# Patient Record
Sex: FEMALE | Race: BLACK OR AFRICAN AMERICAN | Employment: FULL TIME | URBAN - METROPOLITAN AREA
[De-identification: names, ages, dates, MRNs, and addresses within clinical notes are randomized per-mention and may not be internally consistent; named-entity substitution may affect disease eponyms.]

---

## 2017-02-24 RX ORDER — DULOXETIN HYDROCHLORIDE 60 MG/1
CAPSULE, DELAYED RELEASE ORAL
Qty: 30 CAP | Refills: 0 | Status: SHIPPED | OUTPATIENT
Start: 2017-02-24 | End: 2017-06-12 | Stop reason: SDUPTHER

## 2017-02-27 RX ORDER — GABAPENTIN 600 MG/1
TABLET ORAL
Qty: 90 TAB | Refills: 0 | Status: SHIPPED | OUTPATIENT
Start: 2017-02-27 | End: 2017-07-10 | Stop reason: ALTCHOICE

## 2017-03-13 ENCOUNTER — OFFICE VISIT (OUTPATIENT)
Dept: ORTHOPEDIC SURGERY | Age: 62
End: 2017-03-13

## 2017-03-13 VITALS
HEIGHT: 67 IN | RESPIRATION RATE: 18 BRPM | WEIGHT: 248 LBS | SYSTOLIC BLOOD PRESSURE: 166 MMHG | HEART RATE: 84 BPM | DIASTOLIC BLOOD PRESSURE: 79 MMHG | BODY MASS INDEX: 38.92 KG/M2

## 2017-03-13 DIAGNOSIS — M54.16 RADICULOPATHY, LUMBAR REGION: ICD-10-CM

## 2017-03-13 DIAGNOSIS — M48.061 LUMBAR SPINAL STENOSIS: ICD-10-CM

## 2017-03-13 DIAGNOSIS — M51.26 OTHER INTERVERTEBRAL DISC DISPLACEMENT, LUMBAR REGION: ICD-10-CM

## 2017-03-13 DIAGNOSIS — M51.36 OTHER INTERVERTEBRAL DISC DEGENERATION, LUMBAR REGION: Primary | ICD-10-CM

## 2017-03-13 RX ORDER — DULOXETIN HYDROCHLORIDE 60 MG/1
60 CAPSULE, DELAYED RELEASE ORAL DAILY
Qty: 30 CAP | Refills: 2 | Status: SHIPPED | OUTPATIENT
Start: 2017-03-13 | End: 2017-06-12 | Stop reason: SDUPTHER

## 2017-03-13 NOTE — PROGRESS NOTES
[facility]  [address], [place]   Phone: [phone]  Fax: [phone]        PROGRESS NOTE      HISTORY OF PRESENT ILLNESS:  The patient is a 57 y.o. female and was seen today for follow up of low back pain into the left hip extending to the RLE laterally to the foot. She reports bilateral foot numbness in the morning which she speculates could be due to neuropathy. She states that sitting exacerbates her pain. Previously, patient had complaints of low back pain into BLE with pain extending to the knee in the right lower extremity and into the calf in the left lower extremity, with extremity pain greater then back pain. She actually previously reported widespread pain complaints. More recently, she had an injury and continued to be followed by Dr. Regulo Acuna for lower back complaints extending into the BLE. She states she has numbness in bilateral lower extremities laterally to the knees. She states right extremity pain is greater than left. She reported an injury at work while moving a heavy box on 8/5/15. She has been treated with Percocet and Flexeril for pain relief, as well as Naproxen. Patient reports she is intolerant to TOPAMAX, due to memory loss. She reports NEURONTIN causes drowsiness. She tolerates increased Cymbalta 60 mg with slight additional benefit. She reports that she has still not returned to work following the [facility] and is filling for disability. Patient stated she started taking Insulin but blood sugars continue to be poorly controlled. She is followed by Dr. Nidia Kahn for DM. Lower extremity EMG per report revealed evidence of diabetic sensory neuropathy, which appeared to be mild. There was chronic L4-L5 radiculopathy bilaterally, worse on the left. Lumbar spine MRI from 12/9/15 was reviewed. Per report, there was congenitally narrowed spinal canal accentuates the severity of the patient's multilevel degenerative disc disease.  At L5-S1, there is a broad-based central disc protrusion resulting in mild central canal stenosis and narrowing of the lateral recesses. Additional moderate central canal stenosis is seen at L2-L3 and L4-L5. At her last clinical appointment, she was not particularly interested in surgical intervention or blocks at that time. Patient wished to continue her current treatment. She was provided with refills of Cymbalta 60 mg. I encouraged patient to perform her HEP daily. The patient returns today with pain location and distribution remain unchanged. She rates pain 2/10, a slight decrease since her last visit (3/10). Her symptoms wax and wane in nature. Pt is compliant with Cymbalta 60 mg per day. She last attended physical therapy in 2012 and continues with her HEP daily. Her blood sugars continue to be poorly controlled.  reviewed. Past Medical History:   Diagnosis Date    Arthritis     Essential hypertension, benign     High cholesterol     Low back pain     Precordial pain     Type II or unspecified type diabetes mellitus without mention of complication, not stated as uncontrolled     poorly controlled        Social History     Social History    Marital status: LEGALLY      Spouse name: N/A    Number of children: N/A    Years of education: N/A     Occupational History    Not on file. Social History Main Topics    Smoking status: Never Smoker    Smokeless tobacco: Never Used    Alcohol use No    Drug use: No    Sexual activity: Not on file     Other Topics Concern    Not on file     Social History Narrative       Current Outpatient Prescriptions   Medication Sig Dispense Refill    peg 400-propylene glycol (SYSTANE, PROPYLENE GLYCOL,) 0.4-0.3 % drop Administer  to left eye as needed.  DULoxetine (CYMBALTA) 60 mg capsule Take 1 Cap by mouth daily.  30 Cap 2    DULoxetine (CYMBALTA) 60 mg capsule TAKE 1 CAPSULE BY MOUTH DAILY 30 Cap 0    LANTUS 100 unit/mL injection       metFORMIN (GLUCOPHAGE) 500 mg tablet       multivitamin (ONE A DAY) tablet Take 1 Tab by mouth daily.  atorvastatin (LIPITOR) 20 mg tablet       ULTRA THIN LANCETS 31 gauge misc       gabapentin (NEURONTIN) 300 mg capsule Take 1 Cap by mouth three (3) times daily. 90 Cap 1    losartan-hydrochlorothiazide (HYZAAR) 100-25 mg per tablet Take 1 Tab by mouth daily.  naproxen sodium (ALEVE) 220 mg tablet Take 220 mg by mouth. Take 1 every day as needed.  gabapentin (NEURONTIN) 600 mg tablet TAKE 1 TABLET BY MOUTH THREE TIMES DAILY 90 Tab 0    nitroglycerin (NITROSTAT) 0.4 mg SL tablet       gabapentin (NEURONTIN) 100 mg capsule       DULoxetine (CYMBALTA) 20 mg capsule Take 1 Cap by mouth daily. 30 Cap 1    DULoxetine (CYMBALTA) 30 mg capsule Take 1 Cap by mouth daily. 30 Cap 1    LUTEIN PO Take  by mouth.  cyanocobalamin 1,000 mcg tablet Take 1,000 mcg by mouth daily.  TRAVATAN Z 0.004 % ophthalmic solution       omeprazole (PRILOSEC) 20 mg capsule       glipiZIDE SR (GLUCOTROL) 5 mg CR tablet       ADVOCATE REDI-CODE+ misc       ADVOCATE REDI-CODE+ strip       oxyCODONE-acetaminophen (PERCOCET) 5-325 mg per tablet   0    cyclobenzaprine (FLEXERIL) 10 mg tablet   0    saxagliptin (ONGLYZA) 5 mg tab tablet Take  by mouth daily.  FERROUS SULFATE by Does Not Apply route.  traMADol (ULTRAM) 50 mg tablet Take 50 mg by mouth every six (6) hours as needed for Pain.  topiramate (TOPAMAX) 25 mg tablet Day 1-3 take one tab at night,Day 4-6 take two tabs at night,Day 7- onward take 3 tabs at night 90 Tab 1    azelastine (OPTIVAR) 0.05 % ophthalmic solution 1 Every day. Use in affected eye(s)      simvastatin (ZOCOR) 20 mg tablet Take  by mouth nightly.  olmesartan-hydrochlorothiazide (BENICAR HCT) 40-25 mg per tablet Take 1 Tab by mouth daily.  pioglitazone (ACTOS) 30 mg tablet Take  by mouth daily.       glyBURIDE-metFORMIN (GLUCOVANCE) 5-500 mg per tablet Take 1 Tab by mouth two (2) times daily (with meals).  meclizine (ANTIVERT) 25 mg tablet Take  by mouth. Take 1 every six hours as needed. Allergies   Allergen Reactions    Codeine Nausea Only     The patient also can experience dizziness. PHYSICAL EXAMINATION    Visit Vitals    /79 (BP 1 Location: Right arm, BP Patient Position: Sitting)    Pulse 84    Resp 18    Ht 5' 7\" (1.702 m)    Wt 248 lb (112.5 kg)    BMI 38.84 kg/m2       CONSTITUTIONAL: NAD, A&O x 3  SENSATION: Intact to light touch throughout  RANGE OF MOTION: The patient has full passive range of motion in all four extremities. MOTOR:  Straight Leg Raise: Negative, bilateral               Hip Flex Knee Ext Knee Flex Ankle DF GTE Ankle PF Tone   Right +4/5 +4/5 +4/5 +4/5 +4/5 +4/5 +4/5   Left +4/5 +4/5 +4/5 +4/5 +4/5 +4/5 +4/5       ASSESSMENT   Dary Abdalla was seen today for back pain, leg pain and hip pain. Diagnoses and all orders for this visit:    Other intervertebral disc degeneration, lumbar region  -     REFERRAL TO PHYSICAL THERAPY    Radiculopathy, lumbar region  -     REFERRAL TO PHYSICAL THERAPY    Other intervertebral disc displacement, lumbar region  -     REFERRAL TO PHYSICAL THERAPY    Lumbar spinal stenosis  -     REFERRAL TO PHYSICAL THERAPY    Other orders  -     DULoxetine (CYMBALTA) 60 mg capsule; Take 1 Cap by mouth daily. IMPRESSION AND PLAN:  She is not a good candidate for surgical intervention or lumbar blocks at this time due to her poorly controlled blood sugars. I recommend she either f/u with Dr. Batsheva Bermeo or an endocrinologist to stabilize her blood sugars. I will refer her back to physical therapy for a refresher course with an emphasis on HEP. She wishes to continue on Cymbalta 60 mg per day and was provided with refills. I will see the patient back in 6 week's time or earlier if needed.       Written by Steffanie Méndez, as dictated by Tomasa Hicks MD  I examined the patient, reviewed and agree with the note.

## 2017-03-13 NOTE — MR AVS SNAPSHOT
Visit Information Date & Time Provider Department Dept. Phone Encounter #  
 3/13/2017  2:20 PM Geoff Mcknight, [address] [facility] - [place] [phone] Follow-up Instructions Return in about 6 weeks (around 4/24/2017). Upcoming Health Maintenance Date Due Hepatitis C Screening 1955 FOOT EXAM Q1 1/31/1963 EYE EXAM RETINAL OR DILATED Q1 1/31/1963 Pneumococcal 19-64 Medium Risk (1 of 1 - PPSV23) 1/31/1972 DTaP/Tdap/Td series (1 - Tdap) 1/31/1974 PAP AKA CERVICAL CYTOLOGY 1/31/1974 BREAST CANCER SCRN MAMMOGRAM 1/31/2003 FOBT Q 1 YEAR AGE 50-75 1/31/2003 MICROALBUMIN Q1 2/15/2011 HEMOGLOBIN A1C Q6M 4/4/2011 LIPID PANEL Q1 10/4/2011 ZOSTER VACCINE AGE 60> 1/31/2013 INFLUENZA AGE 9 TO ADULT 8/1/2016 Allergies as of 3/13/2017  Review Complete On: 3/13/2017 By: Geoff Mcknight MD  
  
 Severity Noted Reaction Type Reactions Codeine  06/17/2013    Nausea Only The patient also can experience dizziness. Current Immunizations  Never Reviewed No immunizations on file. Not reviewed this visit You Were Diagnosed With   
  
 Codes Comments Other intervertebral disc degeneration, lumbar region    -  Primary ICD-10-CM: M51.36 
ICD-9-CM: 722.52 Radiculopathy, lumbar region     ICD-10-CM: M54.16 
ICD-9-CM: 724.4 Other intervertebral disc displacement, lumbar region     ICD-10-CM: M51.26 
ICD-9-CM: 722.10 Lumbar spinal stenosis     ICD-10-CM: M48.06 
ICD-9-CM: 724.02 Vitals BP Pulse Resp Height(growth percentile) Weight(growth percentile) BMI  
 166/79 (BP 1 Location: Right arm, BP Patient Position: Sitting) 84 18 5' 7\" (1.702 m) 248 lb (112.5 kg) 38.84 kg/m2 Smoking Status Never Smoker Vitals History BMI and BSA Data Body Mass Index Body Surface Area  
 38.84 kg/m 2 2.31 m 2 Preferred Pharmacy Pharmacy Name Phone [facility], [address] [facility] [address] [place] [address] [phone] Your Updated Medication List  
  
   
This list is accurate as of: 3/13/17  3:51 PM.  Always use your most recent med list.  
  
  
  
  
 ACTOS 30 mg tablet Generic drug:  pioglitazone Take  by mouth daily. ADVOCATE REDI-CODE+ Misc Generic drug:  Blood-Glucose Meter ADVOCATE REDI-CODE+ strip Generic drug:  glucose blood VI test strips ALEVE 220 mg tablet Generic drug:  naproxen sodium Take 220 mg by mouth. Take 1 every day as needed. atorvastatin 20 mg tablet Commonly known as:  LIPITOR BENICAR HCT 40-25 mg per tablet Generic drug:  olmesartan-hydroCHLOROthiazide Take 1 Tab by mouth daily. cyanocobalamin 1,000 mcg tablet Take 1,000 mcg by mouth daily. cyclobenzaprine 10 mg tablet Commonly known as:  FLEXERIL * DULoxetine 30 mg capsule Commonly known as:  CYMBALTA Take 1 Cap by mouth daily. * DULoxetine 20 mg capsule Commonly known as:  CYMBALTA Take 1 Cap by mouth daily. * DULoxetine 60 mg capsule Commonly known as:  CYMBALTA TAKE 1 CAPSULE BY MOUTH DAILY * DULoxetine 60 mg capsule Commonly known as:  CYMBALTA Take 1 Cap by mouth daily. FERROUS SULFATE  
by Does Not Apply route. * gabapentin 300 mg capsule Commonly known as:  NEURONTIN Take 1 Cap by mouth three (3) times daily. * gabapentin 100 mg capsule Commonly known as:  NEURONTIN  
  
 * gabapentin 600 mg tablet Commonly known as:  NEURONTIN  
TAKE 1 TABLET BY MOUTH THREE TIMES DAILY  
  
 glipiZIDE SR 5 mg CR tablet Commonly known as:  GLUCOTROL XL  
  
 glyBURIDE-metFORMIN 5-500 mg per tablet Commonly known as:  Marly Lynning Take 1 Tab by mouth two (2) times daily (with meals). LANTUS 100 unit/mL injection Generic drug:  insulin glargine losartan-hydroCHLOROthiazide 100-25 mg per tablet Commonly known as:  HYZAAR Take 1 Tab by mouth daily. LUTEIN PO Take  by mouth.  
  
 meclizine 25 mg tablet Commonly known as:  ANTIVERT Take  by mouth. Take 1 every six hours as needed. metFORMIN 500 mg tablet Commonly known as:  GLUCOPHAGE  
  
 multivitamin tablet Commonly known as:  ONE A DAY Take 1 Tab by mouth daily. nitroglycerin 0.4 mg SL tablet Commonly known as:  NITROSTAT  
  
 omeprazole 20 mg capsule Commonly known as:  PRILOSEC ONGLYZA 5 mg Tab tablet Generic drug:  sAXagliptin Take  by mouth daily. OPTIVAR 0.05 % ophthalmic solution Generic drug:  azelastine 1 Every day. Use in affected eye(s)  
  
 oxyCODONE-acetaminophen 5-325 mg per tablet Commonly known as:  PERCOCET  
  
 SYSTANE (PROPYLENE GLYCOL) 0.4-0.3 % Drop Generic drug:  peg 400-propylene glycol Administer  to left eye as needed. topiramate 25 mg tablet Commonly known as:  TOPAMAX Day 1-3 take one tab at night,Day 4-6 take two tabs at night,Day 7- onward take 3 tabs at night  
  
 traMADol 50 mg tablet Commonly known as:  ULTRAM  
Take 50 mg by mouth every six (6) hours as needed for Pain. TRAVATAN Z 0.004 % ophthalmic solution Generic drug:  travoprost  
  
 ULTRA THIN LANCETS 31 gauge Misc Generic drug:  lancets ZOCOR 20 mg tablet Generic drug:  simvastatin Take  by mouth nightly. * Notice: This list has 7 medication(s) that are the same as other medications prescribed for you. Read the directions carefully, and ask your doctor or other care provider to review them with you. Prescriptions Sent to Pharmacy Refills DULoxetine (CYMBALTA) 60 mg capsule 2 Sig: Take 1 Cap by mouth daily. Class: Normal  
 Pharmacy: [id number], [address] [place] [address] #: [phone]  Route: Oral  
  
 We Performed the Following REFERRAL TO PHYSICAL THERAPY [DAT33 Custom] Comments:  
 DX:LBP to RLE 
HEP 
LOCATION:[facility] 2-3 visits/ 2-3 weeks Follow-up Instructions Return in about 6 weeks (around 4/24/2017). Referral Information Referral ID Referred By Referred To  
  
 [id number] EMMANUELLE LINH SALAZAR III Not Available Visits Status Start Date End Date 9 New Request 3/13/17 3/13/18 If your referral has a status of pending review or denied, additional information will be sent to support the outcome of this decision. Introducing [facility]! Dear Hui Olea: Thank you for requesting a [email or web address] account. Our records indicate that you already have an active [email or web address] account. You can access your account anytime at [email or web address] Did you know that you can access your hospital and ER discharge instructions at any time in [email or web address]? You can also review all of your test results from your hospital stay or ER visit. Additional Information If you have questions, please visit the Frequently Asked Questions section of the [email or web address] website at [email or web address] Remember, [email or web address] is NOT to be used for urgent needs. For medical emergencies, dial 911. Now available from your iPhone and Android! Please provide this summary of care documentation to your next provider. Your primary care clinician is listed as [employer]. If you have any questions after today's visit, please call [phone].

## 2017-06-12 ENCOUNTER — OFFICE VISIT (OUTPATIENT)
Dept: ORTHOPEDIC SURGERY | Age: 62
End: 2017-06-12

## 2017-06-12 VITALS
SYSTOLIC BLOOD PRESSURE: 169 MMHG | HEART RATE: 88 BPM | HEIGHT: 67 IN | WEIGHT: 245 LBS | DIASTOLIC BLOOD PRESSURE: 77 MMHG | BODY MASS INDEX: 38.45 KG/M2

## 2017-06-12 DIAGNOSIS — M51.26 OTHER INTERVERTEBRAL DISC DISPLACEMENT, LUMBAR REGION: ICD-10-CM

## 2017-06-12 DIAGNOSIS — G60.9 HEREDITARY AND IDIOPATHIC NEUROPATHY: ICD-10-CM

## 2017-06-12 DIAGNOSIS — M54.16 RADICULOPATHY, LUMBAR REGION: ICD-10-CM

## 2017-06-12 DIAGNOSIS — M51.26 LUMBAR HERNIATED DISC: Primary | ICD-10-CM

## 2017-06-12 DIAGNOSIS — M51.36 OTHER INTERVERTEBRAL DISC DEGENERATION, LUMBAR REGION: ICD-10-CM

## 2017-06-12 DIAGNOSIS — M48.061 LUMBAR SPINAL STENOSIS: ICD-10-CM

## 2017-06-12 RX ORDER — PREGABALIN 50 MG/1
CAPSULE ORAL
Qty: 21 CAP | Refills: 0 | Status: SHIPPED | OUTPATIENT
Start: 2017-06-12 | End: 2017-07-10 | Stop reason: SDUPTHER

## 2017-06-12 RX ORDER — PREGABALIN 50 MG/1
50 CAPSULE ORAL 2 TIMES DAILY
Qty: 60 CAP | Refills: 1 | Status: SHIPPED | OUTPATIENT
Start: 2017-06-12

## 2017-06-12 RX ORDER — DULOXETIN HYDROCHLORIDE 60 MG/1
60 CAPSULE, DELAYED RELEASE ORAL DAILY
Qty: 90 CAP | Refills: 0 | Status: SHIPPED | OUTPATIENT
Start: 2017-06-12 | End: 2017-07-10 | Stop reason: SDUPTHER

## 2017-06-12 NOTE — PROGRESS NOTES
[facility]  [address], [place]   Phone: [phone]  Fax: [phone]        PROGRESS NOTE      HISTORY OF PRESENT ILLNESS:  The patient is a 57 y.o. female and was seen today for follow up of waxing and waning low back pain into the left hip extending to the RLE laterally to the foot. She reports bilateral foot numbness in the morning which she speculates could be due to neuropathy. She states that sitting exacerbates her pain. This is through [facility]. Previously, patient had complaints of low back pain into BLE with pain extending to the knee in the right lower extremity and into the calf in the left lower extremity, with extremity pain greater then back pain. She actually previously reported widespread pain complaints. More recently, she had an injury and continued to be followed by Dr. Amon Homans for lower back complaints extending into the BLE. She states she has numbness in bilateral lower extremities laterally to the knees. She states right extremity pain is greater than left. She reported an injury at work while moving a heavy box on 8/5/15. She has been treated with Percocet and Flexeril for pain relief, as well as Naproxen. Pt reports intolerance to TOPAMAX, due to memory loss. She reports NEURONTIN causes drowsiness. Pt is compliant with Cymbalta 60 mg per day She reports that she has still not returned to work following the [facility] and is filling for disability. Patient stated she started taking Insulin but blood sugars continue to be poorly controlled. She is followed by Dr. Gael Jenkins for DM. Lower extremity EMG per report revealed evidence of diabetic sensory neuropathy, which appeared to be mild. There was chronic L4-L5 radiculopathy bilaterally, worse on the left. Lumbar spine MRI from 12/9/15 was reviewed. Per report, there was congenitally narrowed spinal canal accentuates the severity of the patient's multilevel degenerative disc disease.  At L5-S1, there is a broad-based central disc protrusion resulting in mild central canal stenosis and narrowing of the lateral recesses. Additional moderate central canal stenosis is seen at L2-L3 and L4-L5. At her last clinical appointment, she was not a good candidate for surgical intervention or lumbar blocks at that time due to her poorly controlled blood sugars. I recommended she either f/u with Dr. Bailee Lux or an endocrinologist to stabilize her blood sugars. I referred her back to physical therapy for a refresher course with an emphasis on HEP. She wished to continue on Cymbalta 60 mg per day and was provided with refills. The patient returns today with pain location and distribution remain unchanged. She rates pain 3/10, a slight increase since her last visit (2/10). Therapy notes reviewed. She completed physical therapy with slight benefit and continues her HEP daily. Pt is compliant with Cymbalta 60 mg per day. Her blood sugars continue to be poorly controlled.  reviewed. Past Medical History:   Diagnosis Date    Arthritis     Essential hypertension, benign     High cholesterol     Low back pain     Precordial pain     Type II or unspecified type diabetes mellitus without mention of complication, not stated as uncontrolled     poorly controlled        Social History     Social History    Marital status: LEGALLY      Spouse name: N/A    Number of children: N/A    Years of education: N/A     Occupational History    Not on file. Social History Main Topics    Smoking status: Never Smoker    Smokeless tobacco: Never Used    Alcohol use No    Drug use: No    Sexual activity: Not on file     Other Topics Concern    Not on file     Social History Narrative       Current Outpatient Prescriptions   Medication Sig Dispense Refill    DULoxetine (CYMBALTA) 60 mg capsule Take 1 Cap by mouth daily.  Indications: NEUROPATHIC PAIN 90 Cap 0    pregabalin (LYRICA) 50 mg capsule Take 1 Cap by mouth two (2) times a day. Max Daily Amount: 100 mg. 60 Cap 1    pregabalin (LYRICA) 50 mg capsule Take 1 po TID. ... Arvell Sudha Arvell Sudha Free Trial 21 Cap 0    nitroglycerin (NITROSTAT) 0.4 mg SL tablet       LANTUS 100 unit/mL injection       atorvastatin (LIPITOR) 20 mg tablet       ULTRA THIN LANCETS 31 gauge misc       ADVOCATE REDI-CODE+ misc       ADVOCATE REDI-CODE+ strip       losartan-hydrochlorothiazide (HYZAAR) 100-25 mg per tablet Take 1 Tab by mouth daily.  FERROUS SULFATE by Does Not Apply route.  azelastine (OPTIVAR) 0.05 % ophthalmic solution 1 Every day. Use in affected eye(s)      naproxen sodium (ALEVE) 220 mg tablet Take 220 mg by mouth. Take 1 every day as needed.  peg 400-propylene glycol (SYSTANE, PROPYLENE GLYCOL,) 0.4-0.3 % drop Administer  to left eye as needed.  gabapentin (NEURONTIN) 600 mg tablet TAKE 1 TABLET BY MOUTH THREE TIMES DAILY 90 Tab 0    gabapentin (NEURONTIN) 100 mg capsule       metFORMIN (GLUCOPHAGE) 500 mg tablet       LUTEIN PO Take  by mouth.  multivitamin (ONE A DAY) tablet Take 1 Tab by mouth daily.  cyanocobalamin 1,000 mcg tablet Take 1,000 mcg by mouth daily.  TRAVATAN Z 0.004 % ophthalmic solution       omeprazole (PRILOSEC) 20 mg capsule       glipiZIDE SR (GLUCOTROL) 5 mg CR tablet       gabapentin (NEURONTIN) 300 mg capsule Take 1 Cap by mouth three (3) times daily. 90 Cap 1    oxyCODONE-acetaminophen (PERCOCET) 5-325 mg per tablet   0    cyclobenzaprine (FLEXERIL) 10 mg tablet   0    saxagliptin (ONGLYZA) 5 mg tab tablet Take  by mouth daily.  traMADol (ULTRAM) 50 mg tablet Take 50 mg by mouth every six (6) hours as needed for Pain.  topiramate (TOPAMAX) 25 mg tablet Day 1-3 take one tab at night,Day 4-6 take two tabs at night,Day 7- onward take 3 tabs at night 90 Tab 1    simvastatin (ZOCOR) 20 mg tablet Take  by mouth nightly.       olmesartan-hydrochlorothiazide (BENICAR HCT) 40-25 mg per tablet Take 1 Tab by mouth daily.  pioglitazone (ACTOS) 30 mg tablet Take  by mouth daily.  glyBURIDE-metFORMIN (GLUCOVANCE) 5-500 mg per tablet Take 1 Tab by mouth two (2) times daily (with meals).  meclizine (ANTIVERT) 25 mg tablet Take  by mouth. Take 1 every six hours as needed. Allergies   Allergen Reactions    Codeine Nausea Only     The patient also can experience dizziness. PHYSICAL EXAMINATION    Visit Vitals    /77    Pulse 88    Ht 5' 7\" (1.702 m)    Wt 245 lb (111.1 kg)    BMI 38.37 kg/m2       CONSTITUTIONAL: NAD, A&O x 3  SENSATION: Decreased sensation to light touch at L4/5 of the RLE. Sensation to light touch otherwise intact. RANGE OF MOTION: The patient has full passive range of motion in all four extremities. MOTOR:  Straight Leg Raise: Negative, bilateral               Hip Flex Knee Ext Knee Flex Ankle DF GTE Ankle PF Tone   Right +4/5 +4/5 +4/5 +4/5 +4/5 +4/5 +4/5   Left +4/5 +4/5 +4/5 +4/5 +4/5 +4/5 +4/5       ASSESSMENT   Everlena Space was seen today for follow-up. Diagnoses and all orders for this visit:    Lumbar herniated disc  -     DULoxetine (CYMBALTA) 60 mg capsule; Take 1 Cap by mouth daily. Indications: NEUROPATHIC PAIN  -     pregabalin (LYRICA) 50 mg capsule; Take 1 Cap by mouth two (2) times a day. Max Daily Amount: 100 mg.  -     pregabalin (LYRICA) 50 mg capsule; Take 1 po TID. ... Shelton Basket Shelton Basket Free Trial    Other intervertebral disc displacement, lumbar region  -     DULoxetine (CYMBALTA) 60 mg capsule; Take 1 Cap by mouth daily. Indications: NEUROPATHIC PAIN  -     pregabalin (LYRICA) 50 mg capsule; Take 1 Cap by mouth two (2) times a day. Max Daily Amount: 100 mg.  -     pregabalin (LYRICA) 50 mg capsule; Take 1 po TID. ... Shelton Basket Shelton Basket Free Trial    Radiculopathy, lumbar region  -     DULoxetine (CYMBALTA) 60 mg capsule; Take 1 Cap by mouth daily. Indications: NEUROPATHIC PAIN  -     pregabalin (LYRICA) 50 mg capsule; Take 1 Cap by mouth two (2) times a day.  Max Daily Amount: 100 mg.  -     pregabalin (LYRICA) 50 mg capsule; Take 1 po TID. ... Afua Dellen Afua Dellen Free Trial    Other intervertebral disc degeneration, lumbar region  -     DULoxetine (CYMBALTA) 60 mg capsule; Take 1 Cap by mouth daily. Indications: NEUROPATHIC PAIN  -     pregabalin (LYRICA) 50 mg capsule; Take 1 Cap by mouth two (2) times a day. Max Daily Amount: 100 mg.  -     pregabalin (LYRICA) 50 mg capsule; Take 1 po TID. ... Afua Dellen Afua Dellen Free Trial    Lumbar spinal stenosis  -     DULoxetine (CYMBALTA) 60 mg capsule; Take 1 Cap by mouth daily. Indications: NEUROPATHIC PAIN  -     pregabalin (LYRICA) 50 mg capsule; Take 1 Cap by mouth two (2) times a day. Max Daily Amount: 100 mg.  -     pregabalin (LYRICA) 50 mg capsule; Take 1 po TID. ... Afua Dellen Afua Dellen Free Trial    Hereditary and idiopathic neuropathy  -     DULoxetine (CYMBALTA) 60 mg capsule; Take 1 Cap by mouth daily. Indications: NEUROPATHIC PAIN  -     pregabalin (LYRICA) 50 mg capsule; Take 1 Cap by mouth two (2) times a day. Max Daily Amount: 100 mg.  -     pregabalin (LYRICA) 50 mg capsule; Take 1 po TID. ... Afua Dellen Afua Dellen Free Trial          IMPRESSION AND PLAN:  Despite adjustments in her diabetic medication, her blood sugars continue to be poorly controlled remaining over 250. I do not believe it would be safe for me to administer lumbar blocks to the patient at this time. I will refer her to endocrinology/diabetes specialist to hopefully stabilize her blood sugars and bring them below 200. I will try her on Lyrica 50 mg BID. The risks, benefits, and potential side effects of this medication were discussed. Patient understands and wishes to proceed. Patient advised to call the office if intolerant to new medication. She was provided with refills of her Cymbalta 60 mg per day. I encourage patient to perform her HEP daily. I will see the patient back in 1 month's time or earlier if needed.       Written by Renae Boone, as dictated by David Vaca MD  I examined the patient, reviewed and agree with the note.

## 2017-06-12 NOTE — MR AVS SNAPSHOT
Visit Information Date & Time Provider Department Dept. Phone Encounter #  
 6/12/2017  9:40 AM Ronald Polanco MD 4 [facility], [address] [facility] [place] [phone] [id number] Follow-up Instructions Return in about 4 weeks (around 7/10/2017). Upcoming Health Maintenance Date Due Hepatitis C Screening 1955 FOOT EXAM Q1 1/31/1963 EYE EXAM RETINAL OR DILATED Q1 1/31/1963 Pneumococcal 19-64 Medium Risk (1 of 1 - PPSV23) 1/31/1972 DTaP/Tdap/Td series (1 - Tdap) 1/31/1974 PAP AKA CERVICAL CYTOLOGY 1/31/1974 BREAST CANCER SCRN MAMMOGRAM 1/31/2003 FOBT Q 1 YEAR AGE 50-75 1/31/2003 MICROALBUMIN Q1 2/15/2011 HEMOGLOBIN A1C Q6M 4/4/2011 LIPID PANEL Q1 10/4/2011 ZOSTER VACCINE AGE 60> 1/31/2013 INFLUENZA AGE 9 TO ADULT 8/1/2017 Allergies as of 6/12/2017  Review Complete On: 6/12/2017 By: Ronald Polanco MD  
  
 Severity Noted Reaction Type Reactions Codeine  06/17/2013    Nausea Only The patient also can experience dizziness. Current Immunizations  Never Reviewed No immunizations on file. Not reviewed this visit You Were Diagnosed With   
  
 Codes Comments Lumbar herniated disc    -  Primary ICD-10-CM: M51.26 
ICD-9-CM: 722.10 Other intervertebral disc displacement, lumbar region     ICD-10-CM: M51.26 
ICD-9-CM: 722.10 Radiculopathy, lumbar region     ICD-10-CM: M54.16 
ICD-9-CM: 724.4 Other intervertebral disc degeneration, lumbar region     ICD-10-CM: M51.36 
ICD-9-CM: 722.52 Lumbar spinal stenosis     ICD-10-CM: M48.06 
ICD-9-CM: 724.02 Hereditary and idiopathic neuropathy     ICD-10-CM: G60.9 ICD-9-CM: 356.9 Vitals BP Pulse Height(growth percentile) Weight(growth percentile) BMI Smoking Status 169/77 88 5' 7\" (1.702 m) 245 lb (111.1 kg) 38.37 kg/m2 Never Smoker Vitals History BMI and BSA Data Body Mass Index Body Surface Area 38.37 kg/m 2 2.29 m 2 Preferred Pharmacy Pharmacy Name Phone [facility], [address] [place] [address] [phone] Your Updated Medication List  
  
   
This list is accurate as of: 6/12/17 10:52 AM.  Always use your most recent med list.  
  
  
  
  
 ACTOS 30 mg tablet Generic drug:  pioglitazone Take  by mouth daily. ADVOCATE REDI-CODE+ Misc Generic drug:  Blood-Glucose Meter ADVOCATE REDI-CODE+ strip Generic drug:  glucose blood VI test strips ALEVE 220 mg tablet Generic drug:  naproxen sodium Take 220 mg by mouth. Take 1 every day as needed. atorvastatin 20 mg tablet Commonly known as:  LIPITOR BENICAR HCT 40-25 mg per tablet Generic drug:  olmesartan-hydroCHLOROthiazide Take 1 Tab by mouth daily. cyanocobalamin 1,000 mcg tablet Take 1,000 mcg by mouth daily. cyclobenzaprine 10 mg tablet Commonly known as:  FLEXERIL  
  
 DULoxetine 60 mg capsule Commonly known as:  CYMBALTA Take 1 Cap by mouth daily. Indications: NEUROPATHIC PAIN  
  
 FERROUS SULFATE  
by Does Not Apply route. * gabapentin 300 mg capsule Commonly known as:  NEURONTIN Take 1 Cap by mouth three (3) times daily. * gabapentin 100 mg capsule Commonly known as:  NEURONTIN  
  
 * gabapentin 600 mg tablet Commonly known as:  NEURONTIN  
TAKE 1 TABLET BY MOUTH THREE TIMES DAILY  
  
 glipiZIDE SR 5 mg CR tablet Commonly known as:  GLUCOTROL XL  
  
 glyBURIDE-metFORMIN 5-500 mg per tablet Commonly known as:  Jona Roses Take 1 Tab by mouth two (2) times daily (with meals). LANTUS 100 unit/mL injection Generic drug:  insulin glargine  
  
 losartan-hydroCHLOROthiazide 100-25 mg per tablet Commonly known as:  HYZAAR Take 1 Tab by mouth daily. LUTEIN PO Take  by mouth.  
  
 meclizine 25 mg tablet Commonly known as:  ANTIVERT  
 Take  by mouth. Take 1 every six hours as needed. metFORMIN 500 mg tablet Commonly known as:  GLUCOPHAGE  
  
 multivitamin tablet Commonly known as:  ONE A DAY Take 1 Tab by mouth daily. nitroglycerin 0.4 mg SL tablet Commonly known as:  NITROSTAT  
  
 omeprazole 20 mg capsule Commonly known as:  PRILOSEC ONGLYZA 5 mg Tab tablet Generic drug:  sAXagliptin Take  by mouth daily. OPTIVAR 0.05 % ophthalmic solution Generic drug:  azelastine 1 Every day. Use in affected eye(s)  
  
 oxyCODONE-acetaminophen 5-325 mg per tablet Commonly known as:  PERCOCET * pregabalin 50 mg capsule Commonly known as:  Mina Milagros Take 1 Cap by mouth two (2) times a day. Max Daily Amount: 100 mg.  
  
 * pregabalin 50 mg capsule Commonly known as:  Mina Milagros Take 1 po TID. ... Janiya Denson Salvia Free Trial  
  
 SYSTANE (PROPYLENE GLYCOL) 0.4-0.3 % Drop Generic drug:  peg 400-propylene glycol Administer  to left eye as needed. topiramate 25 mg tablet Commonly known as:  TOPAMAX Day 1-3 take one tab at night,Day 4-6 take two tabs at night,Day 7- onward take 3 tabs at night  
  
 traMADol 50 mg tablet Commonly known as:  ULTRAM  
Take 50 mg by mouth every six (6) hours as needed for Pain. TRAVATAN Z 0.004 % ophthalmic solution Generic drug:  travoprost  
  
 ULTRA THIN LANCETS 31 gauge Misc Generic drug:  lancets ZOCOR 20 mg tablet Generic drug:  simvastatin Take  by mouth nightly. * Notice: This list has 5 medication(s) that are the same as other medications prescribed for you. Read the directions carefully, and ask your doctor or other care provider to review them with you. Prescriptions Printed Refills  
 pregabalin (LYRICA) 50 mg capsule 1 Sig: Take 1 Cap by mouth two (2) times a day. Max Daily Amount: 100 mg. Class: Print Route: Oral  
 pregabalin (LYRICA) 50 mg capsule 0 Sig: Take 1 po TID. ... Janiya Salvia Janiya Salvia Free Trial  
 Class: Print Prescriptions Sent to Pharmacy Refills DULoxetine (CYMBALTA) 60 mg capsule 0 Sig: Take 1 Cap by mouth daily. Indications: NEUROPATHIC PAIN Class: Normal  
 Pharmacy: [facility], [address] [place] [address] #: [phone] Route: Oral  
  
Follow-up Instructions Return in about 4 weeks (around 7/10/2017). Introducing [facility] SERVICES! Dear Luis Manuel Fontaine: Thank you for requesting a [employer] account. Our records indicate that you already have an active [employer] account. You can access your account anytime at [email or web address] Did you know that you can access your hospital and ER discharge instructions at any time in [employer]? You can also review all of your test results from your hospital stay or ER visit. Additional Information If you have questions, please visit the Frequently Asked Questions section of the [employer] website at [email or web address] Remember, [employer] is NOT to be used for urgent needs. For medical emergencies, dial 911. Now available from your iPhone and Android! Please provide this summary of care documentation to your next provider. Your primary care clinician is listed as [employer]. If you have any questions after today's visit, please call [phone].

## 2017-06-13 ENCOUNTER — TELEPHONE (OUTPATIENT)
Dept: ORTHOPEDIC SURGERY | Age: 62
End: 2017-06-13

## 2017-06-13 NOTE — TELEPHONE ENCOUNTER
Called and spoke to Dr. Isacc Billingsley nurse and inquired per the provider in regards to a referral to an Endocrinologist/diabetes specialist to get the patients blood sugar under better control for potential block injections. She states that Dr. Isacc Billingsley was/is treating the patient but she would check and fax our office back a note stating whether or not a referral needs to be done or he would follow up with her. I left our fax number [phone].

## 2017-06-13 NOTE — TELEPHONE ENCOUNTER
Called and spoke to  and inquired about the protocol for a referral to endocrinologist for the patients uncontrolled blood sugar because the provider at this time will not be doing injections on the patient until it is controlled. Mr. Vasu Richardson states he thought the patient was going to be let go at this appointment. He states he is not sure why she is still being seen because he has hard time contacting the patient and dr. Becca Lund seems to be letting him hang out there. He states they will not authorize a referral for an endocrinologist that is on the patient that she is not taking care of herself. They will authorize her next appointment in July and after that he will be stopping her care. Mr. Vasu Richardson asked me how did the patient look at her appointment yesterday and I informed I was unable to describe it but he is welcomed to come to her appointment. So, that he can speak to her and the provider and he states then they would have to hire a nurse and that he would not be doing that. He states he would be stopping her payments/care and she would have to figure it out from there.

## 2017-06-27 NOTE — TELEPHONE ENCOUNTER
Called and spoke to the  at Dr. Sloane Patton office and I was informed that Dr. Sloane Patton wanted to see the patient first and do some lab work. Patient was seen today and in their office and Dr. Sloane Patton will follow up with any necessary referrals.

## 2017-07-10 ENCOUNTER — OFFICE VISIT (OUTPATIENT)
Dept: ORTHOPEDIC SURGERY | Age: 62
End: 2017-07-10

## 2017-07-10 VITALS
HEIGHT: 67 IN | SYSTOLIC BLOOD PRESSURE: 163 MMHG | HEART RATE: 75 BPM | DIASTOLIC BLOOD PRESSURE: 78 MMHG | WEIGHT: 243 LBS | BODY MASS INDEX: 38.14 KG/M2

## 2017-07-10 DIAGNOSIS — M51.26 LUMBAR HERNIATED DISC: ICD-10-CM

## 2017-07-10 DIAGNOSIS — M51.36 OTHER INTERVERTEBRAL DISC DEGENERATION, LUMBAR REGION: ICD-10-CM

## 2017-07-10 DIAGNOSIS — G60.9 HEREDITARY AND IDIOPATHIC NEUROPATHY: ICD-10-CM

## 2017-07-10 DIAGNOSIS — M54.16 RADICULOPATHY, LUMBAR REGION: ICD-10-CM

## 2017-07-10 DIAGNOSIS — M51.26 OTHER INTERVERTEBRAL DISC DISPLACEMENT, LUMBAR REGION: ICD-10-CM

## 2017-07-10 DIAGNOSIS — M48.061 LUMBAR SPINAL STENOSIS: Primary | ICD-10-CM

## 2017-07-10 RX ORDER — DULOXETIN HYDROCHLORIDE 60 MG/1
60 CAPSULE, DELAYED RELEASE ORAL DAILY
Qty: 90 CAP | Refills: 0 | Status: SHIPPED | OUTPATIENT
Start: 2017-07-10

## 2017-07-10 RX ORDER — FLUTICASONE PROPIONATE 50 MCG
SPRAY, SUSPENSION (ML) NASAL
COMMUNITY
Start: 2017-06-27

## 2017-07-10 RX ORDER — DESLORATADINE 5 MG/1
TABLET ORAL
COMMUNITY
Start: 2017-06-27

## 2017-07-10 NOTE — PROGRESS NOTES
[facility]  [address]  [address], [address]   Phone: [phone]  Fax: [phone]        PROGRESS NOTE      HISTORY OF PRESENT ILLNESS:  The patient is a 57 y.o. female and was seen today for follow up of waxing and waning low back pain into the left hip extending to the RLE laterally to the foot. She reports bilateral foot numbness in the morning which she speculates could be due to neuropathy. She states that sitting exacerbates her pain. This is through [facility]. Previously, patient had complaints of low back pain into BLE (R>L) with pain extending to the knee in the RLE and into the calf in the LLE, with extremity pain greater then back pain. She actually previously reported widespread pain complaints. More recently, she had an injury and continued to be followed by Dr. Emilia Mccord for lower back complaints extending into the BLE. She states she has numbness in BLE laterally to the knees. She reported an injury at work while moving a heavy box on 8/5/15. Pt completed physical therapy with slight benefit and continues her HEP daily. She has been treated with Percocet and Flexeril for pain relief, as well as Naproxen. Pt reports intolerance to TOPAMAX, due to memory loss. She reports NEURONTIN causes drowsiness. Pt is compliant with Cymbalta 60 mg per day. She reports that she has still not returned to work following [facility] and is filling for disability. Patient stated she started taking Insulin but blood sugars continue to be poorly controlled. She is followed by Dr. Ana Bernard for DM. Lower extremity EMG per report revealed evidence of diabetic sensory neuropathy, which appeared to be mild. There was chronic L4-L5 radiculopathy bilaterally, worse on the left. Lumbar spine MRI from 12/9/15 was reviewed. Per report, there was congenitally narrowed spinal canal accentuates the severity of the patient's multilevel degenerative disc disease.  At L5-S1, there is a broad-based central disc protrusion resulting in mild central canal stenosis and narrowing of the lateral recesses. Additional moderate central canal stenosis is seen at L2-L3 and L4-L5. At her last clinical appointment, despite adjustments in her diabetic medication, her blood sugars continued to be poorly controlled remaining over 250. I did not believe it would be safe for me to administer lumbar blocks to the patient at that time. I referred her to endocrinology/diabetes specialist to hopefully stabilize her blood sugars and bring them below 200. I tried her on Lyrica 50 mg BID. She was provided with refills of her Cymbalta 60 mg per day. I encouraged patient to perform her HEP daily. The patient returns today with pain location and distribution remain unchanged. She rates pain 1-7/10, elevated since her last visit (3/10). Pt states she is only taking LYRICA 50 mg once daily as she is intolerant to higher doses. She is compliant with Cymbalta 60 mg per day. Pt completes her HEP 3x/day. Her blood sugars continue to be poorly controlled and reports an A1C of 10%.  reviewed. Past Medical History:   Diagnosis Date    Arthritis     Essential hypertension, benign     High cholesterol     Low back pain     Precordial pain     Type II or unspecified type diabetes mellitus without mention of complication, not stated as uncontrolled     poorly controlled        Social History     Social History    Marital status: LEGALLY      Spouse name: N/A    Number of children: N/A    Years of education: N/A     Occupational History    Not on file.      Social History Main Topics    Smoking status: Never Smoker    Smokeless tobacco: Never Used    Alcohol use No    Drug use: No    Sexual activity: Not on file     Other Topics Concern    Not on file     Social History Narrative       Current Outpatient Prescriptions   Medication Sig Dispense Refill    desloratadine (CLARINEX) 5 mg tablet       fluticasone (FLONASE) 50 mcg/actuation nasal spray       DULoxetine (CYMBALTA) 60 mg capsule Take 1 Cap by mouth daily. Indications: NEUROPATHIC PAIN 90 Cap 0    pregabalin (LYRICA) 50 mg capsule Take 1 Cap by mouth two (2) times a day. Max Daily Amount: 100 mg. 60 Cap 1    peg 400-propylene glycol (SYSTANE, PROPYLENE GLYCOL,) 0.4-0.3 % drop Administer  to left eye as needed.  nitroglycerin (NITROSTAT) 0.4 mg SL tablet       LANTUS 100 unit/mL injection       metFORMIN (GLUCOPHAGE) 500 mg tablet       LUTEIN PO Take  by mouth.  atorvastatin (LIPITOR) 20 mg tablet       TRAVATAN Z 0.004 % ophthalmic solution       ULTRA THIN LANCETS 31 gauge misc       ADVOCATE REDI-CODE+ misc       ADVOCATE REDI-CODE+ strip       losartan-hydrochlorothiazide (HYZAAR) 100-25 mg per tablet Take 1 Tab by mouth daily.  saxagliptin (ONGLYZA) 5 mg tab tablet Take  by mouth daily.  FERROUS SULFATE by Does Not Apply route.  azelastine (OPTIVAR) 0.05 % ophthalmic solution 1 Every day. Use in affected eye(s)      naproxen sodium (ALEVE) 220 mg tablet Take 220 mg by mouth. Take 1 every day as needed.  multivitamin (ONE A DAY) tablet Take 1 Tab by mouth daily.  cyanocobalamin 1,000 mcg tablet Take 1,000 mcg by mouth daily.  omeprazole (PRILOSEC) 20 mg capsule       glipiZIDE SR (GLUCOTROL) 5 mg CR tablet       oxyCODONE-acetaminophen (PERCOCET) 5-325 mg per tablet   0    cyclobenzaprine (FLEXERIL) 10 mg tablet   0    traMADol (ULTRAM) 50 mg tablet Take 50 mg by mouth every six (6) hours as needed for Pain.  simvastatin (ZOCOR) 20 mg tablet Take  by mouth nightly.  olmesartan-hydrochlorothiazide (BENICAR HCT) 40-25 mg per tablet Take 1 Tab by mouth daily.  pioglitazone (ACTOS) 30 mg tablet Take  by mouth daily.  glyBURIDE-metFORMIN (GLUCOVANCE) 5-500 mg per tablet Take 1 Tab by mouth two (2) times daily (with meals).       meclizine (ANTIVERT) 25 mg tablet Take  by mouth. Take 1 every six hours as needed. Allergies   Allergen Reactions    Codeine Nausea Only     The patient also can experience dizziness. PHYSICAL EXAMINATION    Visit Vitals    /78    Pulse 75    Ht 5' 7\" (1.702 m)    Wt 243 lb (110.2 kg)    BMI 38.06 kg/m2       CONSTITUTIONAL: NAD, A&O x 3  SENSATION: Decreased sensation to light touch at L5 of the RLE. Sensation to light touch otherwise intact. RANGE OF MOTION: The patient has full passive range of motion in all four extremities. MOTOR:  Straight Leg Raise: Negative, bilateral               Hip Flex Knee Ext Knee Flex Ankle DF GTE Ankle PF Tone   Right +4/5 +4/5 +4/5 +4/5 +4/5 +4/5 +4/5   Left +4/5 +4/5 +4/5 +4/5 +4/5 +4/5 +4/5       ASSESSMENT   Salomón Oseguera was seen today for follow-up. Diagnoses and all orders for this visit:    Lumbar spinal stenosis  -     DULoxetine (CYMBALTA) 60 mg capsule; Take 1 Cap by mouth daily. Indications: NEUROPATHIC PAIN  -     REFERRAL TO PAIN MANAGEMENT    Other intervertebral disc displacement, lumbar region  -     DULoxetine (CYMBALTA) 60 mg capsule; Take 1 Cap by mouth daily. Indications: NEUROPATHIC PAIN  -     REFERRAL TO PAIN MANAGEMENT    Radiculopathy, lumbar region  -     DULoxetine (CYMBALTA) 60 mg capsule; Take 1 Cap by mouth daily. Indications: NEUROPATHIC PAIN  -     REFERRAL TO PAIN MANAGEMENT    Other intervertebral disc degeneration, lumbar region  -     DULoxetine (CYMBALTA) 60 mg capsule; Take 1 Cap by mouth daily. Indications: NEUROPATHIC PAIN  -     REFERRAL TO PAIN MANAGEMENT    Lumbar herniated disc  -     DULoxetine (CYMBALTA) 60 mg capsule; Take 1 Cap by mouth daily. Indications: NEUROPATHIC PAIN  -     REFERRAL TO PAIN MANAGEMENT    Hereditary and idiopathic neuropathy  -     DULoxetine (CYMBALTA) 60 mg capsule; Take 1 Cap by mouth daily. Indications: NEUROPATHIC PAIN  -     REFERRAL TO PAIN MANAGEMENT          IMPRESSION AND PLAN:  I will refer her to pain management. She was provided with refills of her Cymbalta 60 mg in the interim. I will see the patient back on an as-needed basis. Written by Carrie Iqbal, as dictated by Pete Pratt MD  I examined the patient, reviewed and agree with the note.

## 2017-07-10 NOTE — MR AVS SNAPSHOT
Visit Information Date & Time Provider Department Dept. Phone Encounter #  
 7/10/2017  2:45 PM Brittni Traylor MD 4 [facility], [address] [facility] - [place] [address] [id number] Follow-up Instructions Return if symptoms worsen or fail to improve. Upcoming Health Maintenance Date Due Hepatitis C Screening 1955 FOOT EXAM Q1 1/31/1963 EYE EXAM RETINAL OR DILATED Q1 1/31/1963 Pneumococcal 19-64 Medium Risk (1 of 1 - PPSV23) 1/31/1972 DTaP/Tdap/Td series (1 - Tdap) 1/31/1974 PAP AKA CERVICAL CYTOLOGY 1/31/1974 BREAST CANCER SCRN MAMMOGRAM 1/31/2003 FOBT Q 1 YEAR AGE 50-75 1/31/2003 MICROALBUMIN Q1 2/15/2011 HEMOGLOBIN A1C Q6M 4/4/2011 LIPID PANEL Q1 10/4/2011 ZOSTER VACCINE AGE 60> 1/31/2013 INFLUENZA AGE 9 TO ADULT 8/1/2017 Allergies as of 7/10/2017  Review Complete On: 7/10/2017 By: Brittni Traylor MD  
  
 Severity Noted Reaction Type Reactions Codeine  06/17/2013    Nausea Only The patient also can experience dizziness. Current Immunizations  Never Reviewed No immunizations on file. Not reviewed this visit You Were Diagnosed With   
  
 Codes Comments Lumbar spinal stenosis    -  Primary ICD-10-CM: M48.06 
ICD-9-CM: 724.02 Other intervertebral disc displacement, lumbar region     ICD-10-CM: M51.26 
ICD-9-CM: 722.10 Radiculopathy, lumbar region     ICD-10-CM: M54.16 
ICD-9-CM: 724.4 Other intervertebral disc degeneration, lumbar region     ICD-10-CM: M51.36 
ICD-9-CM: 722.52 Lumbar herniated disc     ICD-10-CM: M51.26 
ICD-9-CM: 722.10 Hereditary and idiopathic neuropathy     ICD-10-CM: G60.9 ICD-9-CM: 356.9 Vitals BP Pulse Height(growth percentile) Weight(growth percentile) BMI Smoking Status 163/78 75 5' 7\" (1.702 m) 243 lb (110.2 kg) 38.06 kg/m2 Never Smoker Vitals History BMI and BSA Data Body Mass Index Body Surface Area 38.06 kg/m 2 2.28 m 2 Preferred Pharmacy Pharmacy Name Phone [facility], [address] [place] [address] [phone] Your Updated Medication List  
  
   
This list is accurate as of: 7/10/17  4:13 PM.  Always use your most recent med list.  
  
  
  
  
 ACTOS 30 mg tablet Generic drug:  pioglitazone Take  by mouth daily. ADVOCATE REDI-CODE+ Misc Generic drug:  Blood-Glucose Meter ADVOCATE REDI-CODE+ strip Generic drug:  glucose blood VI test strips ALEVE 220 mg tablet Generic drug:  naproxen sodium Take 220 mg by mouth. Take 1 every day as needed. atorvastatin 20 mg tablet Commonly known as:  LIPITOR BENICAR HCT 40-25 mg per tablet Generic drug:  olmesartan-hydroCHLOROthiazide Take 1 Tab by mouth daily. cyanocobalamin 1,000 mcg tablet Take 1,000 mcg by mouth daily. cyclobenzaprine 10 mg tablet Commonly known as:  FLEXERIL  
  
 desloratadine 5 mg tablet Commonly known as:  CLARINEX DULoxetine 60 mg capsule Commonly known as:  CYMBALTA Take 1 Cap by mouth daily. Indications: NEUROPATHIC PAIN  
  
 FERROUS SULFATE  
by Does Not Apply route. fluticasone 50 mcg/actuation nasal spray Commonly known as:  FLONASE  
  
 glipiZIDE SR 5 mg CR tablet Commonly known as:  GLUCOTROL XL  
  
 glyBURIDE-metFORMIN 5-500 mg per tablet Commonly known as:  Basilio Lie Take 1 Tab by mouth two (2) times daily (with meals). LANTUS 100 unit/mL injection Generic drug:  insulin glargine  
  
 losartan-hydroCHLOROthiazide 100-25 mg per tablet Commonly known as:  HYZAAR Take 1 Tab by mouth daily. LUTEIN PO Take  by mouth.  
  
 meclizine 25 mg tablet Commonly known as:  ANTIVERT Take  by mouth. Take 1 every six hours as needed. metFORMIN 500 mg tablet Commonly known as:  GLUCOPHAGE  
  
 multivitamin tablet Commonly known as:  ONE A DAY  
 Take 1 Tab by mouth daily. nitroglycerin 0.4 mg SL tablet Commonly known as:  NITROSTAT  
  
 omeprazole 20 mg capsule Commonly known as:  PRILOSEC ONGLYZA 5 mg Tab tablet Generic drug:  sAXagliptin Take  by mouth daily. OPTIVAR 0.05 % ophthalmic solution Generic drug:  azelastine 1 Every day. Use in affected eye(s)  
  
 oxyCODONE-acetaminophen 5-325 mg per tablet Commonly known as:  PERCOCET  
  
 pregabalin 50 mg capsule Commonly known as:  Temple Jewel Take 1 Cap by mouth two (2) times a day. Max Daily Amount: 100 mg. SYSTANE (PROPYLENE GLYCOL) 0.4-0.3 % Drop Generic drug:  peg 400-propylene glycol Administer  to left eye as needed. traMADol 50 mg tablet Commonly known as:  ULTRAM  
Take 50 mg by mouth every six (6) hours as needed for Pain. TRAVATAN Z 0.004 % ophthalmic solution Generic drug:  travoprost  
  
 ULTRA THIN LANCETS 31 gauge Misc Generic drug:  lancets ZOCOR 20 mg tablet Generic drug:  simvastatin Take  by mouth nightly. Prescriptions Sent to Pharmacy Refills DULoxetine (CYMBALTA) 60 mg capsule 0 Sig: Take 1 Cap by mouth daily. Indications: NEUROPATHIC PAIN Class: Normal  
 Pharmacy: [address] [place] [address], [address] [place] [address] #: [phone] Route: Oral  
  
We Performed the Following REFERRAL TO PAIN MANAGEMENT [HJP902 Custom] Comments: EVAL AND TREAT Follow-up Instructions Return if symptoms worsen or fail to improve. Referral Information Referral ID Referred By Referred To  
  
 [id number] LINH HANDLEY III Not Available Visits Status Start Date End Date 1 New Request 7/10/17 7/10/18 If your referral has a status of pending review or denied, additional information will be sent to support the outcome of this decision. Introducing [facility]!    
 Dear Judith Vinson: 
 Thank you for requesting a MtoV account. Our records indicate that you already have an active MtoV account. You can access your account anytime at [email or web address] Did you know that you can access your hospital and ER discharge instructions at any time in MtoV? You can also review all of your test results from your hospital stay or ER visit. Additional Information If you have questions, please visit the Frequently Asked Questions section of the MtoV website at [email or web address] Remember, MtoV is NOT to be used for urgent needs. For medical emergencies, dial 911. Now available from your iPhone and Android! Please provide this summary of care documentation to your next provider. Your primary care clinician is listed as [employer]. If you have any questions after today's visit, please call [phone].

## 2019-08-16 ENCOUNTER — OFFICE VISIT (OUTPATIENT)
Dept: ORTHOPEDIC SURGERY | Age: 64
End: 2019-08-16

## 2019-08-16 VITALS
WEIGHT: 241 LBS | DIASTOLIC BLOOD PRESSURE: 82 MMHG | OXYGEN SATURATION: 99 % | SYSTOLIC BLOOD PRESSURE: 176 MMHG | HEIGHT: 67 IN | BODY MASS INDEX: 37.83 KG/M2 | RESPIRATION RATE: 18 BRPM | HEART RATE: 95 BPM

## 2019-08-16 DIAGNOSIS — G56.03 CARPAL TUNNEL SYNDROME, BILATERAL: Primary | ICD-10-CM

## 2019-08-16 NOTE — PROGRESS NOTES
Gianfranco Adler is a 75 y.o. female right handed unknown employment. Worker's Compensation and legal considerations: not known. Vitals:    08/16/19 1020   BP: 176/82   Pulse: 95   Resp: 18   SpO2: 99%   Weight: 241 lb (109.3 kg)   Height: 5' 7\" (1.702 m)   PainSc:   7           Chief Complaint   Patient presents with    Hand Pain     bilateral hand pain         HPI: Patient comes in today with complaints of bilateral hand pain and numbness. She reports she was told she has carpal tunnel years ago. Date of onset: Indeterminate    Injury: No    Prior Treatment:  No    Numbness/ Tingling: Yes: Comment: Bilateral hands    ROS: Review of Systems - General ROS: negative  Respiratory ROS: no cough, shortness of breath, or wheezing  Cardiovascular ROS: no chest pain or dyspnea on exertion  Musculoskeletal ROS: positive for - pain in hand - bilateral  Neurological ROS: positive for - numbness/tingling  Dermatological ROS: negative    Past Medical History:   Diagnosis Date    Arthritis     Essential hypertension, benign     High cholesterol     Low back pain     Precordial pain     Type II or unspecified type diabetes mellitus without mention of complication, not stated as uncontrolled     poorly controlled       Past Surgical History:   Procedure Laterality Date    HX OTHER SURGICAL  2014    thyroidectomy       Current Outpatient Medications   Medication Sig Dispense Refill    triamcinolone acetonide (KENALOG) 10 mg/mL injection 1 mL by Intra artICUlar route once for 1 dose. 1 Vial 0    desloratadine (CLARINEX) 5 mg tablet       fluticasone (FLONASE) 50 mcg/actuation nasal spray       DULoxetine (CYMBALTA) 60 mg capsule Take 1 Cap by mouth daily. Indications: NEUROPATHIC PAIN 90 Cap 0    pregabalin (LYRICA) 50 mg capsule Take 1 Cap by mouth two (2) times a day. Max Daily Amount: 100 mg. 60 Cap 1    peg 400-propylene glycol (SYSTANE, PROPYLENE GLYCOL,) 0.4-0.3 % drop Administer  to left eye as needed.  nitroglycerin (NITROSTAT) 0.4 mg SL tablet       LANTUS 100 unit/mL injection       metFORMIN (GLUCOPHAGE) 500 mg tablet       LUTEIN PO Take  by mouth.  multivitamin (ONE A DAY) tablet Take 1 Tab by mouth daily.  cyanocobalamin 1,000 mcg tablet Take 1,000 mcg by mouth daily.  atorvastatin (LIPITOR) 20 mg tablet       TRAVATAN Z 0.004 % ophthalmic solution       omeprazole (PRILOSEC) 20 mg capsule       ULTRA THIN LANCETS 31 gauge misc       glipiZIDE SR (GLUCOTROL) 5 mg CR tablet       ADVOCATE REDI-CODE+ misc       ADVOCATE REDI-CODE+ strip       oxyCODONE-acetaminophen (PERCOCET) 5-325 mg per tablet   0    cyclobenzaprine (FLEXERIL) 10 mg tablet   0    losartan-hydrochlorothiazide (HYZAAR) 100-25 mg per tablet Take 1 Tab by mouth daily.  saxagliptin (ONGLYZA) 5 mg tab tablet Take  by mouth daily.  FERROUS SULFATE by Does Not Apply route.  traMADol (ULTRAM) 50 mg tablet Take 50 mg by mouth every six (6) hours as needed for Pain.  azelastine (OPTIVAR) 0.05 % ophthalmic solution 1 Every day. Use in affected eye(s)      simvastatin (ZOCOR) 20 mg tablet Take  by mouth nightly.  olmesartan-hydrochlorothiazide (BENICAR HCT) 40-25 mg per tablet Take 1 Tab by mouth daily.  pioglitazone (ACTOS) 30 mg tablet Take  by mouth daily.  naproxen sodium (ALEVE) 220 mg tablet Take 220 mg by mouth. Take 1 every day as needed.  glyBURIDE-metFORMIN (GLUCOVANCE) 5-500 mg per tablet Take 1 Tab by mouth two (2) times daily (with meals).  meclizine (ANTIVERT) 25 mg tablet Take  by mouth. Take 1 every six hours as needed. Allergies   Allergen Reactions    Codeine Nausea Only     The patient also can experience dizziness. Other reaction(s): gi distress         PE:     NEUROVASCULAR    Examination L R Examination L R   Carpal Comp. + + Pronator Comp. - -   Carpal Tinel + + Pronator Tinel - -   Phalen's + + Pronator Stress - -   Cubital Comp.  - - Guyon Comp. - -   Cubital Tinel - - Guyon Tinel - -   Elbow Hyperflexion - - Adson's - -   Spurling's - - SC Comp. - -   PCB Median abn - - SC Tinel - -   Radial Tinel - - IC Comp. - -   Digital Tinel - - IC Tinel - -   Radial 2-Pt WNL WNL Ulnar 2-Pt WNL WNL     Radial Pulse: 2+  Capillary Refill: < 2 sec  Daniel: Not Performed  Digital Daniel: Not Performed      Imaging: None indicated today        ICD-10-CM ICD-9-CM    1. Carpal tunnel syndrome, bilateral G56.03 354.0 AMB SUPPLY ORDER      TRIAMCINOLONE ACETONIDE INJ      triamcinolone acetonide (KENALOG) 10 mg/mL injection      INJECT CARPAL TUNNEL      EMG TWO EXTREMITIES UPPER      NCV/LAT MOTOR PER NERVE UP/RT      NCV/LAT MOTOR PER NERVE UP/LT       Plan:     Bilateral carpal tunnel injections in bilateral braces for nighttime wear    Bilateral upper extremity EMG and nerve conduction studies    Follow-up after EMG    Plan was reviewed with patient, who verbalized agreement and understanding of the plan    [address] NOTE        Chart reviewed for the following:   Oliver TOVAR DO, have reviewed the History, Physical and updated the Allergic reactions for [employer]     TIME OUT performed immediately prior to start of procedure:   Oliver TOVAR DO, have performed the following reviews on [employer] prior to the start of the procedure:            * Patient was identified by name and date of birth   * Agreement on procedure being performed was verified  * Risks and Benefits explained to the patient  * Procedure site verified and marked as necessary  * Patient was positioned for comfort  * Consent was signed and verified     Time: 11:00 AM      Date of procedure: 8/16/2019    Procedure performed by:   Misti Quintero DO    Provider assisted by: Adan Tran LPN    Patient assisted by: self    How tolerated by patient: tolerated the procedure well with no complications    Post Procedural Pain Scale: 0 - No Hurt    Comments: none    Procedure:  After consent was obtained, using sterile technique the carpal tunnel was prepped. Local anesthetic used: 1% lidocaine. Kenalog 5 mg X2 and was then injected and the needle withdrawn. The procedure was well tolerated. The patient is asked to continue to rest the area for a few more days before resuming regular activities. It may be more painful for the first 1-2 days. Watch for fever, or increased swelling or persistent pain in the joint. Call or return to clinic prn if such symptoms occur or there is failure to improve as anticipated.

## 2019-09-20 ENCOUNTER — OFFICE VISIT (OUTPATIENT)
Dept: ORTHOPEDIC SURGERY | Age: 64
End: 2019-09-20

## 2019-09-20 VITALS
DIASTOLIC BLOOD PRESSURE: 79 MMHG | HEART RATE: 92 BPM | SYSTOLIC BLOOD PRESSURE: 157 MMHG | HEIGHT: 67 IN | BODY MASS INDEX: 37.35 KG/M2 | WEIGHT: 238 LBS

## 2019-09-20 DIAGNOSIS — R20.2 NUMBNESS AND TINGLING IN BOTH HANDS: Primary | ICD-10-CM

## 2019-09-20 DIAGNOSIS — E66.01 SEVERE OBESITY (HCC): ICD-10-CM

## 2019-09-20 DIAGNOSIS — R20.0 NUMBNESS AND TINGLING IN BOTH HANDS: Primary | ICD-10-CM

## 2019-09-20 DIAGNOSIS — R94.131 ABNORMAL EMG: ICD-10-CM

## 2019-09-20 NOTE — LETTER
9/20/19 Patient: Chris Li YOB: 1955 Date of Visit: 9/20/2019 NIMA [place]/ Eric Calle[address] [id number] [address] [address] VIA Facsimile: [phone] [address] [place] [id number] [address] [address] VIA In Basket Dear DO Yolette [place] DO Thank you for referring Ms. Luis Cuadra to [address] [place] [address] for evaluation. My notes for this consultation are attached. If you have questions, please do not hesitate to call me. I look forward to following your patient along with you.  
 
 
Sincerely, 
 
Rosalina Perez MD

## 2019-09-20 NOTE — PROGRESS NOTES
[address], [address]  [address]  Phone: [phone]  Fax: [phone]        Buddy Stephenson  : 1955  PCP: Dana Montalvo DO  2019    ELECTROMYOGRAPHY AND NERVE CONDUCTION STUDIES    Karime Terrazas was referred by Dr. Abena Rico for electrodiagnostic evaluation of bilateral hand numbness and tingling. NCV & EMG Findings:  Evaluation of the left median motor nerve showed prolonged distal onset latency (11.0 ms) and decreased conduction velocity (Elbow-Wrist, 48 m/s). The right median motor nerve showed prolonged distal onset latency (12.9 ms), reduced amplitude (1.2 mV), and decreased conduction velocity (Elbow-Wrist, 38 m/s). The left ulnar motor and the right ulnar motor nerves showed decreased conduction velocity (A Elbow-B Elbow, L45, R45 m/s). The left median sensory and the right median sensory nerves showed no response (Wrist). All remaining nerves (as indicated in the following tables) were within normal limits. Left vs. Right side comparison data for the median motor nerve indicates abnormal L-R latency difference (1.9 ms), abnormal L-R amplitude difference (79.3 %), and abnormal L-R velocity difference (Elbow-Wrist, 10 m/s). All remaining left vs. right side differences were within normal limits. Needle evaluation of the left abductor pollicis brevis muscle showed increased motor unit amplitude. The right abductor pollicis brevis muscle showed increased insertional activity, moderately increased spontaneous activity, and increased motor unit amplitude. All remaining muscles (as indicated in the following table) showed no evidence of electrical instability. INTERPRETATION  This is an abnormal electrodiagnostic examination. These findings may be consistent with:  1. Chronic moderate median mononeuropathy at the left wrist (carpal tunnel)  2.  Chronic severe median mononeuropathy at the right wrist (carpal tunnel syndrome)    There is no electrodiagnostic evidence of any cervical radiculopathy, brachial plexopathy, peripheral polyneuropathy, or any other mononeuropathy. CLINICAL INTERPRETATION  The electrodiagnostic findings of carpal tunnel syndrome correlate with her bilateral hand symptoms. HISTORY OF PRESENT ILLNESS  Tommy Vizcaino is a 75 y.o. female. Pt presents today for BUE EMG evaluation of bilateral hand numbness and tingling. Pt notes that she had an EMG about 10 years ago, and she was told at the time that she had carpal tunnel syndrome bilaterally. PAST MEDICAL HISTORY   Past Medical History:   Diagnosis Date    Arthritis     Essential hypertension, benign     High cholesterol     Low back pain     Precordial pain     Type II or unspecified type diabetes mellitus without mention of complication, not stated as uncontrolled     poorly controlled       Past Surgical History:   Procedure Laterality Date    HX OTHER SURGICAL  2014    thyroidectomy   . MEDICATIONS    Current Outpatient Medications   Medication Sig Dispense Refill    desloratadine (CLARINEX) 5 mg tablet       fluticasone (FLONASE) 50 mcg/actuation nasal spray       DULoxetine (CYMBALTA) 60 mg capsule Take 1 Cap by mouth daily. Indications: NEUROPATHIC PAIN 90 Cap 0    pregabalin (LYRICA) 50 mg capsule Take 1 Cap by mouth two (2) times a day. Max Daily Amount: 100 mg. 60 Cap 1    peg 400-propylene glycol (SYSTANE, PROPYLENE GLYCOL,) 0.4-0.3 % drop Administer  to left eye as needed.  nitroglycerin (NITROSTAT) 0.4 mg SL tablet       LANTUS 100 unit/mL injection       metFORMIN (GLUCOPHAGE) 500 mg tablet       LUTEIN PO Take  by mouth.  multivitamin (ONE A DAY) tablet Take 1 Tab by mouth daily.  cyanocobalamin 1,000 mcg tablet Take 1,000 mcg by mouth daily.       atorvastatin (LIPITOR) 20 mg tablet       TRAVATAN Z 0.004 % ophthalmic solution       omeprazole (PRILOSEC) 20 mg capsule  ULTRA THIN LANCETS 31 gauge misc       glipiZIDE SR (GLUCOTROL) 5 mg CR tablet       ADVOCATE REDI-CODE+ misc       ADVOCATE REDI-CODE+ strip       oxyCODONE-acetaminophen (PERCOCET) 5-325 mg per tablet   0    cyclobenzaprine (FLEXERIL) 10 mg tablet   0    losartan-hydrochlorothiazide (HYZAAR) 100-25 mg per tablet Take 1 Tab by mouth daily.  saxagliptin (ONGLYZA) 5 mg tab tablet Take  by mouth daily.  FERROUS SULFATE by Does Not Apply route.  traMADol (ULTRAM) 50 mg tablet Take 50 mg by mouth every six (6) hours as needed for Pain.  azelastine (OPTIVAR) 0.05 % ophthalmic solution 1 Every day. Use in affected eye(s)      simvastatin (ZOCOR) 20 mg tablet Take  by mouth nightly.  olmesartan-hydrochlorothiazide (BENICAR HCT) 40-25 mg per tablet Take 1 Tab by mouth daily.  pioglitazone (ACTOS) 30 mg tablet Take  by mouth daily.  naproxen sodium (ALEVE) 220 mg tablet Take 220 mg by mouth. Take 1 every day as needed.  glyBURIDE-metFORMIN (GLUCOVANCE) 5-500 mg per tablet Take 1 Tab by mouth two (2) times daily (with meals).  meclizine (ANTIVERT) 25 mg tablet Take  by mouth. Take 1 every six hours as needed. ALLERGIES  Allergies   Allergen Reactions    Codeine Nausea Only     The patient also can experience dizziness.   Other reaction(s): gi distress          SOCIAL HISTORY    Social History     Socioeconomic History    Marital status: LEGALLY      Spouse name: Not on file    Number of children: Not on file    Years of education: Not on file    Highest education level: Not on file   Tobacco Use    Smoking status: Never Smoker    Smokeless tobacco: Never Used   Substance and Sexual Activity    Alcohol use: No     Alcohol/week: 0.0 standard drinks    Drug use: No       FAMILY HISTORY  Family History   Problem Relation Age of Onset    Diabetes Mother     Hypertension Father     Arthritis-osteo Sister     Diabetes Sister     Hypertension Sister     Diabetes Brother     Hypertension Brother     Heart Disease Brother          PHYSICAL EXAMINATION  There were no vitals taken for this visit. Pain Assessment  8/16/2019   Location of Pain Hand   Pain Location Comment -   Location Modifiers Right;Left   Severity of Pain 7   Quality of Pain Sharp; Aching; Throbbing   Quality of Pain Comment -   Duration of Pain Other (Comment)   Frequency of Pain Intermittent   Aggravating Factors Bending;Straightening;Stretching   Aggravating Factors Comment -   Limiting Behavior Some   Relieving Factors Elevation   Relieving Factors Comment -   Result of Injury No   Work-Related Injury -   How long out of work? -   Type of Injury -   Type of Injury Comment -           Constitutional:  Well developed, well nourished, in no acute distress. Psychiatric: Affect and mood are appropriate. Integumentary: No rashes or abrasions noted on exposed areas. SPINE/MUSCULOSKELETAL EXAM    On brief examination: + carpal compress, carpal tinel, and Phalen's bilaterally.     MOTOR:      Biceps  Triceps Deltoids Wrist Ext Wrist Flex Hand Intrin   Right 5/5 5/5 5/5 5/5 5/5 5/5   Left 5/5 5/5 5/5 5/5 5/5 5/5             Hip Flex  Quads Hamstrings Ankle DF EHL Ankle PF   Right 5/5 5/5 5/5 5/5 5/5 5/5   Left 5/5 5/5 5/5 5/5 5/5 5/5     NCV & EMG Findings:  Nerve Conduction Studies  Anti Sensory Summary Table     Stim Site NR Peak (ms) Norm Peak (ms) O-P Amp (µV) Norm O-P Amp Site1 Site2 Delta-P (ms) Dist (cm) Pedro (m/s) Norm Pedro (m/s)   Left Median Anti Sensory (2nd Digit)   Wrist NR  <3.6  >10 Wrist 2nd Digit  14.0  >39   Right Median Anti Sensory (2nd Digit)   Wrist NR  <3.6  >10 Wrist 2nd Digit  14.0  >39   Left Radial Anti Sensory (Base 1st Digit)   Wrist    2.1 <3.1 44.2  Wrist Base 1st Digit 2.1 0.0     Right Radial Anti Sensory (Base 1st Digit)   Wrist    2.2 <3.1 46.3  Wrist Base 1st Digit 2.2 0.0     Left Ulnar Anti Sensory (5th Digit)   Wrist    3.6 <3.7 15.7 >15.0 Wrist 5th Digit 3.6 14.0 39 >38   Right Ulnar Anti Sensory (5th Digit)   Wrist    3.4 <3.7 38.0 >15.0 Wrist 5th Digit 3.4 14.0 41 >38     Motor Summary Table     Stim Site NR Onset (ms) Norm Onset (ms) O-P Amp (mV) Norm O-P Amp Site1 Site2 Delta-0 (ms) Dist (cm) Pedro (m/s) Norm Pedro (m/s)   Left Median Motor (Abd Poll Brev)   Wrist    11.0 <4.2 5.8 >5 Elbow Wrist 4.8 23.0 48 >50   Elbow    15.8  4.9          Right Median Motor (Abd Poll Brev)   Wrist    12.9 <4.2 1.2 >5 Elbow Wrist 5.9 22.5 38 >50   Elbow    18.8  1.4          Left Ulnar Motor (Abd Dig Min)   Wrist    2.9 <4.2 10.8 >3 B Elbow Wrist 3.9 21.0 54 >53   B Elbow    6.8  9.9  A Elbow B Elbow 2.2 10.0 45 >53   A Elbow    9.0  9.7          Right Ulnar Motor (Abd Dig Min)   Wrist    3.0 <4.2 11.6 >3 B Elbow Wrist 3.6 20.0 56 >53   B Elbow    6.6  10.6  A Elbow B Elbow 2.2 10.0 45 >53   A Elbow    8.8  10.4            EMG     Side Muscle Nerve Root Ins Act Fibs Psw Amp Dur Poly Recrt Int Errol Newsome Comment   Left Biceps Musculocut C5-6 Nml Nml Nml Nml Nml 0 Nml Nml    Left Triceps Radial C6-7-8 Nml Nml Nml Nml Nml 0 Nml Nml    Left PronatorTeres Median C6-7 Nml Nml Nml Nml Nml 0 Nml Nml    Left Abd Poll Brev Median C8-T1 Nml Nml Nml Incr Nml 0 Nml Nml    Left 1stDorInt Ulnar C8-T1 Nml Nml Nml Nml Nml 0 Nml Nml    Right Biceps Musculocut C5-6 Nml Nml Nml Nml Nml 0 Nml Nml    Right Triceps Radial C6-7-8 Nml Nml Nml Nml Nml 0 Nml Nml    Right Abd Poll Brev Median C8-T1 Incr 1+ 2+ Incr Nml 0 Nml Nml    Right 1stDorInt Ulnar C8-T1 Nml Nml Nml Nml Nml 0 Nml Nml    Right PronatorTeres Median C6-7 Nml Nml Nml Nml Nml 0 Nml Nml        Nerve Conduction Studies  Anti Sensory Left/Right Comparison     Stim Site L Lat (ms) R Lat (ms) L-R Lat (ms) L Amp (µV) R Amp (µV) L-R Amp (%) Site1 Site2 L Pedro (m/s) R Pedro (m/s) L-R Pedro (m/s)   Median Anti Sensory (2nd Digit)   Wrist       Wrist 2nd Digit      Radial Anti Sensory (Base 1st Digit)   Wrist 2.1 2.2 0.1 44.2 46.3 4.5 Wrist Base 1st Digit Ulnar Anti Sensory (5th Digit)   Wrist 3.6 3.4 0.2 15.7 38.0 58.7 Wrist 5th Digit 39 41 2     Motor Left/Right Comparison     Stim Site L Lat (ms) R Lat (ms) L-R Lat (ms) L Amp (mV) R Amp (mV) L-R Amp (%) Site1 Site2 L Pedro (m/s) R Pedro (m/s) L-R Pedro (m/s)   Median Motor (Abd Poll Brev)   Wrist 11.0 12.9 1.9 5.8 1.2 79.3 Elbow Wrist 48 38 10   Elbow 15.8 18.8 3.0 4.9 1.4 71.4        Ulnar Motor (Abd Dig Min)   Wrist 2.9 3.0 0.1 10.8 11.6 6.9 B Elbow Wrist 54 56 2   B Elbow 6.8 6.6 0.2 9.9 10.6 6.6 A Elbow B Elbow 45 45 0   A Elbow 9.0 8.8 0.2 9.7 10.4 6.7              Waveforms:                                  [facility]  OFFICE PROCEDURE PROGRESS NOTE        Chart reviewed for the following:   I, Geri Primrose, have reviewed the History, Physical and updated the Allergic reactions for [facility]     TIME OUT performed immediately prior to start of procedure:   I, Geri Primrose, have performed the following reviews on [facility] prior to the start of the procedure:            * Patient was identified by name and date of birth   * Agreement on procedure being performed was verified  * Risks and Benefits explained to the patient  * Procedure site verified and marked as necessary  * Patient was positioned for comfort  * Consent was signed and verified     Time: 1:55 PM      Date of procedure: 9/20/2019    Procedure performed by:  Fanny Bence, MD    Provider accompanied by: Verenice.     Patient accompanied by: None    How tolerated by patient: tolerated the procedure well with no complications    Post Procedural Pain Scale: 0 - No Hurt    Comments: none    Written by Carmella Ramos, [address] as dictated by Geri Primrose, MD

## 2019-10-04 ENCOUNTER — OFFICE VISIT (OUTPATIENT)
Dept: ORTHOPEDIC SURGERY | Age: 64
End: 2019-10-04

## 2019-10-04 VITALS
HEART RATE: 88 BPM | WEIGHT: 240 LBS | HEIGHT: 67 IN | SYSTOLIC BLOOD PRESSURE: 151 MMHG | TEMPERATURE: 98 F | DIASTOLIC BLOOD PRESSURE: 77 MMHG | OXYGEN SATURATION: 100 % | BODY MASS INDEX: 37.67 KG/M2

## 2019-10-04 DIAGNOSIS — G56.03 CARPAL TUNNEL SYNDROME, BILATERAL: Primary | ICD-10-CM

## 2019-10-04 NOTE — PROGRESS NOTES
Amber Barry is a 75 y.o. female right handed unknown employment. Worker's Compensation and legal considerations: not known. Vitals:    10/04/19 1122   BP: 151/77   Pulse: 88   Temp: 98 °F (36.7 °C)   TempSrc: Oral   SpO2: 100%   Weight: 240 lb (108.9 kg)   Height: 5' 7\" (1.702 m)   PainSc:   4           No chief complaint on file. HPI: Patient comes in today for follow-up of her bilateral EMG and nerve conduction studies. She had bilateral carpal tunnel injections last time that it helped and she is been wearing bilateral carpal tunnel braces. Initial HPI: Patient comes in today with complaints of bilateral hand pain and numbness. She reports she was told she has carpal tunnel years ago. Date of onset: Indeterminate    Injury: No    Prior Treatment:  No    Numbness/ Tingling: Yes: Comment: Bilateral hands    ROS: Review of Systems - General ROS: negative  Respiratory ROS: no cough, shortness of breath, or wheezing  Cardiovascular ROS: no chest pain or dyspnea on exertion  Musculoskeletal ROS: positive for - pain in hand - bilateral  Neurological ROS: positive for - numbness/tingling  Dermatological ROS: negative    Past Medical History:   Diagnosis Date    Arthritis     Essential hypertension, benign     High cholesterol     Low back pain     Precordial pain     Type II or unspecified type diabetes mellitus without mention of complication, not stated as uncontrolled     poorly controlled       Past Surgical History:   Procedure Laterality Date    HX OTHER SURGICAL  2014    thyroidectomy       Current Outpatient Medications   Medication Sig Dispense Refill    desloratadine (CLARINEX) 5 mg tablet       DULoxetine (CYMBALTA) 60 mg capsule Take 1 Cap by mouth daily. Indications: NEUROPATHIC PAIN 90 Cap 0    pregabalin (LYRICA) 50 mg capsule Take 1 Cap by mouth two (2) times a day.  Max Daily Amount: 100 mg. 60 Cap 1    nitroglycerin (NITROSTAT) 0.4 mg SL tablet       LANTUS 100 unit/mL injection       LUTEIN PO Take  by mouth.  multivitamin (ONE A DAY) tablet Take 1 Tab by mouth daily.  cyanocobalamin 1,000 mcg tablet Take 1,000 mcg by mouth daily.  atorvastatin (LIPITOR) 20 mg tablet       glipiZIDE SR (GLUCOTROL) 5 mg CR tablet       losartan-hydrochlorothiazide (HYZAAR) 100-25 mg per tablet Take 1 Tab by mouth daily.  glyBURIDE-metFORMIN (GLUCOVANCE) 5-500 mg per tablet Take 1 Tab by mouth two (2) times daily (with meals).  fluticasone (FLONASE) 50 mcg/actuation nasal spray       peg 400-propylene glycol (SYSTANE, PROPYLENE GLYCOL,) 0.4-0.3 % drop Administer  to left eye as needed.  metFORMIN (GLUCOPHAGE) 500 mg tablet       TRAVATAN Z 0.004 % ophthalmic solution       omeprazole (PRILOSEC) 20 mg capsule       ULTRA THIN LANCETS 31 gauge misc       ADVOCATE REDI-CODE+ misc       ADVOCATE REDI-CODE+ strip       oxyCODONE-acetaminophen (PERCOCET) 5-325 mg per tablet   0    cyclobenzaprine (FLEXERIL) 10 mg tablet   0    saxagliptin (ONGLYZA) 5 mg tab tablet Take  by mouth daily.  FERROUS SULFATE by Does Not Apply route.  traMADol (ULTRAM) 50 mg tablet Take 50 mg by mouth every six (6) hours as needed for Pain.  azelastine (OPTIVAR) 0.05 % ophthalmic solution 1 Every day. Use in affected eye(s)      simvastatin (ZOCOR) 20 mg tablet Take  by mouth nightly.  olmesartan-hydrochlorothiazide (BENICAR HCT) 40-25 mg per tablet Take 1 Tab by mouth daily.  pioglitazone (ACTOS) 30 mg tablet Take  by mouth daily.  naproxen sodium (ALEVE) 220 mg tablet Take 220 mg by mouth. Take 1 every day as needed.  meclizine (ANTIVERT) 25 mg tablet Take  by mouth. Take 1 every six hours as needed. Allergies   Allergen Reactions    Codeine Nausea Only     The patient also can experience dizziness. Other reaction(s): gi distress         PE:     NEUROVASCULAR    Examination L R Examination L R   Carpal Comp. + + Pronator Comp.  - - Carpal Tinel + + Pronator Tinel - -   Phalen's + + Pronator Stress - -   Cubital Comp. - - Guyon Comp. - -   Cubital Tinel - - Guyon Tinel - -   Elbow Hyperflexion - - Adson's - -   Spurling's - - SC Comp. - -   PCB Median abn - - SC Tinel - -   Radial Tinel - - IC Comp. - -   Digital Tinel - - IC Tinel - -   Radial 2-Pt WNL WNL Ulnar 2-Pt WNL WNL     Radial Pulse: 2+  Capillary Refill: < 2 sec  Daniel: Not Performed  [employer]: Not Performed      Imaging: None indicated today    NCV & EMG Findings:  Evaluation of the left median motor nerve showed prolonged distal onset latency (11.0 ms) and decreased conduction velocity (Elbow-Wrist, 48 m/s). The right median motor nerve showed prolonged distal onset latency (12.9 ms), reduced amplitude (1.2 mV), and decreased conduction velocity (Elbow-Wrist, 38 m/s). The left ulnar motor and the right ulnar motor nerves showed decreased conduction velocity (A Elbow-B Elbow, L45, R45 m/s). The left median sensory and the right median sensory nerves showed no response (Wrist). All remaining nerves (as indicated in the following tables) were within normal limits. Left vs. Right side comparison data for the median motor nerve indicates abnormal L-R latency difference (1.9 ms), abnormal L-R amplitude difference (79.3 %), and abnormal L-R velocity difference (Elbow-Wrist, 10 m/s). All remaining left vs. right side differences were within normal limits.       Needle evaluation of the left abductor pollicis brevis muscle showed increased motor unit amplitude. The right abductor pollicis brevis muscle showed increased insertional activity, moderately increased spontaneous activity, and increased motor unit amplitude. All remaining muscles (as indicated in the following table) showed no evidence of electrical instability.       INTERPRETATION  This is an abnormal electrodiagnostic examination. These findings may be consistent with:  1.  Chronic moderate median mononeuropathy at the left wrist (carpal tunnel)  2. Chronic severe median mononeuropathy at the right wrist (carpal tunnel syndrome)     There is no electrodiagnostic evidence of any cervical radiculopathy, brachial plexopathy, peripheral polyneuropathy, or any other mononeuropathy.     CLINICAL INTERPRETATION  The electrodiagnostic findings of carpal tunnel syndrome correlate with her bilateral hand symptoms. ICD-10-CM ICD-9-CM    1. Carpal tunnel syndrome, bilateral G56.03 354.0        Plan:     I had a discussion with the patient regarding the need for carpal tunnel release on both sides but worse on the right. She says she would like to think about this and to call and make an appointment if she decides she wants to have surgery and also told her to continue to wear her braces at night.     Plan was reviewed with patient, who verbalized agreement and understanding of the plan